# Patient Record
Sex: FEMALE | Race: WHITE | NOT HISPANIC OR LATINO | Employment: FULL TIME | URBAN - METROPOLITAN AREA
[De-identification: names, ages, dates, MRNs, and addresses within clinical notes are randomized per-mention and may not be internally consistent; named-entity substitution may affect disease eponyms.]

---

## 2023-08-19 ENCOUNTER — OFFICE VISIT (OUTPATIENT)
Dept: URGENT CARE | Facility: CLINIC | Age: 53
End: 2023-08-19
Payer: COMMERCIAL

## 2023-08-19 VITALS
HEIGHT: 65 IN | DIASTOLIC BLOOD PRESSURE: 77 MMHG | TEMPERATURE: 98 F | RESPIRATION RATE: 18 BRPM | SYSTOLIC BLOOD PRESSURE: 123 MMHG | OXYGEN SATURATION: 99 % | HEART RATE: 78 BPM | BODY MASS INDEX: 20.83 KG/M2 | WEIGHT: 125 LBS

## 2023-08-19 DIAGNOSIS — J34.89 SINUS PRESSURE: ICD-10-CM

## 2023-08-19 DIAGNOSIS — U07.1 COVID: ICD-10-CM

## 2023-08-19 DIAGNOSIS — R51.9 ACUTE INTRACTABLE HEADACHE, UNSPECIFIED HEADACHE TYPE: Primary | ICD-10-CM

## 2023-08-19 DIAGNOSIS — R35.0 FREQUENCY OF URINATION: ICD-10-CM

## 2023-08-19 DIAGNOSIS — M54.9 BACK PAIN, UNSPECIFIED BACK LOCATION, UNSPECIFIED BACK PAIN LATERALITY, UNSPECIFIED CHRONICITY: ICD-10-CM

## 2023-08-19 LAB
BILIRUB UR QL STRIP: NEGATIVE
CTP QC/QA: YES
GLUCOSE UR QL STRIP: NEGATIVE
KETONES UR QL STRIP: NEGATIVE
LEUKOCYTE ESTERASE UR QL STRIP: NEGATIVE
PH, POC UA: 6
POC BLOOD, URINE: NEGATIVE
POC NITRATES, URINE: NEGATIVE
PROT UR QL STRIP: NEGATIVE
SARS-COV-2 AG RESP QL IA.RAPID: NEGATIVE
SP GR UR STRIP: 1.01 (ref 1–1.03)
UROBILINOGEN UR STRIP-ACNC: 0.2 (ref 0.1–1.1)

## 2023-08-19 PROCEDURE — 99214 PR OFFICE/OUTPT VISIT, EST, LEVL IV, 30-39 MIN: ICD-10-PCS | Mod: S$GLB,,, | Performed by: NURSE PRACTITIONER

## 2023-08-19 PROCEDURE — 87811 SARS-COV-2 COVID19 W/OPTIC: CPT | Mod: QW,S$GLB,, | Performed by: NURSE PRACTITIONER

## 2023-08-19 PROCEDURE — 99214 OFFICE O/P EST MOD 30 MIN: CPT | Mod: S$GLB,,, | Performed by: NURSE PRACTITIONER

## 2023-08-19 PROCEDURE — 81003 URINALYSIS AUTO W/O SCOPE: CPT | Mod: QW,S$GLB,, | Performed by: NURSE PRACTITIONER

## 2023-08-19 PROCEDURE — 87811 SARS CORONAVIRUS 2 ANTIGEN POCT, MANUAL READ: ICD-10-PCS | Mod: QW,S$GLB,, | Performed by: NURSE PRACTITIONER

## 2023-08-19 PROCEDURE — 81003 POCT URINALYSIS, DIPSTICK, AUTOMATED, W/O SCOPE: ICD-10-PCS | Mod: QW,S$GLB,, | Performed by: NURSE PRACTITIONER

## 2023-08-19 RX ORDER — TOPIRAMATE 100 MG/1
100 TABLET, FILM COATED ORAL 2 TIMES DAILY
COMMUNITY

## 2023-08-19 RX ORDER — TRAZODONE HYDROCHLORIDE 50 MG/1
50 TABLET ORAL NIGHTLY
COMMUNITY
Start: 2023-08-12

## 2023-08-19 RX ORDER — CYCLOBENZAPRINE HCL 5 MG
10 TABLET ORAL NIGHTLY PRN
Qty: 15 TABLET | Refills: 0 | Status: SHIPPED | OUTPATIENT
Start: 2023-08-19

## 2023-08-19 RX ORDER — ATORVASTATIN CALCIUM 40 MG/1
40 TABLET, FILM COATED ORAL DAILY
COMMUNITY

## 2023-08-19 RX ORDER — LINACLOTIDE 145 UG/1
145 CAPSULE, GELATIN COATED ORAL EVERY MORNING
COMMUNITY
Start: 2023-08-12

## 2023-08-19 NOTE — PATIENT INSTRUCTIONS
"Hi Savannah,     Claritin(loratadine), Allegra(fexofenadine), or zyrtec(cetirizine) for runny nose, post nasal drip, and congestion.   Mucinex DM for cough.    Tylenol 500 mg every 6 hours for fever or pain.   Ibuprofen 600 mg every 6 hours for fever or pain.   Flonase daily for 2 weeks  Sudafed 30 mg every 4-6 hours for "stuffy ears"  Go to er for shortness of breath, chest pain, or fever 101.   Wear a mask for 10 days.   "

## 2023-08-19 NOTE — PROGRESS NOTES
"Subjective:      Patient ID: Savannah Martines is a 52 y.o. female.    Vitals:  height is 5' 5" (1.651 m) and weight is 56.7 kg (125 lb). Her oral temperature is 98.4 °F (36.9 °C). Her blood pressure is 123/77 and her pulse is 78. Her respiration is 18 and oxygen saturation is 99%.     Chief Complaint: Headache, Sinus Problem, Abdominal Pain, Back Pain, Urinary Tract Infection, and Ear Fullness    Pt states that her symptoms started on 2 weeks ago. Patient states that her symptoms are the following: headaches, sneezing, sinus pressure, pain in abdomen and back, hard time focusing, frequency, urgency, bilateral ear fullness.pt states that she has hx of hypercalcemia, and kidney issues. Patient denies any other symptoms  New patient.  Does not indicate a timing for any worsening of the symptoms.      ROS   Objective:     Physical Exam   Constitutional: She is oriented to person, place, and time.   HENT:   Head: Normocephalic and atraumatic.   Ears:   Right Ear: Tympanic membrane normal.   Left Ear: Tympanic membrane normal.   Nose: Nose normal.   Mouth/Throat: Mucous membranes are moist. Oropharynx is clear.   Eyes: Pupils are equal, round, and reactive to light.   Neck: Neck supple.   Cardiovascular: Normal rate, regular rhythm, normal heart sounds and normal pulses.   Pulmonary/Chest: Effort normal and breath sounds normal.   Abdominal: Normal appearance. Soft. flat abdomen   Musculoskeletal: Normal range of motion.         General: Normal range of motion.   Neurological: no focal deficit. She is alert, oriented to person, place, and time and at baseline. She displays no weakness. No cranial nerve deficit or sensory deficit. Coordination and gait normal.   Skin: Skin is warm and dry. Capillary refill takes less than 2 seconds.       Assessment:     1. Acute intractable headache, unspecified headache type    2. Sinus pressure    3. Back pain, unspecified back location, unspecified back pain laterality, unspecified " "chronicity    4. Frequency of urination    5. COVID        Plan:       Acute intractable headache, unspecified headache type  -     SARS Coronavirus 2 Antigen, POCT Manual Read  -     Cancel: POCT Influenza A/B Rapid Antigen    Sinus pressure  -     SARS Coronavirus 2 Antigen, POCT Manual Read  -     Cancel: POCT Influenza A/B Rapid Antigen    Back pain, unspecified back location, unspecified back pain laterality, unspecified chronicity  -     POCT Urinalysis, Dipstick, Automated, W/O Scope    Frequency of urination  -     POCT Urinalysis, Dipstick, Automated, W/O Scope    COVID    Other orders  -     cyclobenzaprine (FLEXERIL) 5 MG tablet; Take 2 tablets (10 mg total) by mouth nightly as needed for Muscle spasms.  Dispense: 15 tablet; Refill: 0                0      COVID positive low risk for complications.  Point of service urinalysis negative for UTI.  Neck spasm no evidence of nuchal rigidity.  Kernig sign is negative.  Palpable spasms of the cervical spine noted Flexeril as above.  May take Tylenol and ibuprofen.    Patient Instructions   Hi Savannah,     Claritin(loratadine), Allegra(fexofenadine), or zyrtec(cetirizine) for runny nose, post nasal drip, and congestion.   Mucinex DM for cough.    Tylenol 500 mg every 6 hours for fever or pain.   Ibuprofen 600 mg every 6 hours for fever or pain.   Flonase daily for 2 weeks  Sudafed 30 mg every 4-6 hours for "stuffy ears"  Go to er for shortness of breath, chest pain, or fever 101.   Wear a mask for 10 days.    In excessive of 30 minutes total time spent for evaluation and management services. Time included elements of the following: time spent preparing to see patient, obtaining and reviewing separately obtained history, exam, evaluation, counseling and education of patient/family member or care giver, documenting in the HMR, independently interpreting results and communication of results, coordination of care ordering medications, tests, or procedures, referral " and communication to other health care professionals.

## 2025-02-19 ENCOUNTER — OFFICE VISIT (OUTPATIENT)
Dept: URGENT CARE | Facility: CLINIC | Age: 55
End: 2025-02-19
Payer: COMMERCIAL

## 2025-02-19 VITALS
TEMPERATURE: 99 F | RESPIRATION RATE: 16 BRPM | DIASTOLIC BLOOD PRESSURE: 78 MMHG | BODY MASS INDEX: 20.83 KG/M2 | HEART RATE: 94 BPM | OXYGEN SATURATION: 99 % | SYSTOLIC BLOOD PRESSURE: 124 MMHG | HEIGHT: 65 IN | WEIGHT: 125 LBS

## 2025-02-19 DIAGNOSIS — M79.672 LEFT FOOT PAIN: Primary | ICD-10-CM

## 2025-02-19 RX ORDER — KETOROLAC TROMETHAMINE 30 MG/ML
30 INJECTION, SOLUTION INTRAMUSCULAR; INTRAVENOUS
Status: COMPLETED | OUTPATIENT
Start: 2025-02-19 | End: 2025-02-19

## 2025-02-19 RX ADMIN — KETOROLAC TROMETHAMINE 30 MG: 30 INJECTION, SOLUTION INTRAMUSCULAR; INTRAVENOUS at 01:02

## 2025-02-19 NOTE — PROGRESS NOTES
"Subjective:      Patient ID: Savannah Martines is a 54 y.o. female.    Vitals:  height is 5' 5" (1.651 m) and weight is 56.7 kg (125 lb). Her oral temperature is 99.4 °F (37.4 °C). Her blood pressure is 124/78 and her pulse is 94. Her respiration is 16 and oxygen saturation is 99%.     Chief Complaint: Foot Injury (Left)    Last night, patient got up in the middle of the night to go the bathroom.  While walking to the bathroom she accidentally kicked the door frame.  Complaining of left foot 3rd, 4th, and 5th foot pain with pain radiating into for foot.  She has taken nothing for pain.  She did elevate last night while sleeping.  No numbness or tingling.  No history of neuropathy.  Pain is aggravated with weight-bearing activities.  No associated symptoms.  No alleviating factors.  Pain has been constant    Foot Injury   Incident onset: Yesterday. The injury mechanism was a direct blow. The pain is present in the left foot and left toes. The pain is at a severity of 6/10. The pain has been Constant since onset. She has tried elevation for the symptoms. The treatment provided no relief.       Musculoskeletal:  Positive for pain, trauma and joint pain. Negative for joint swelling and abnormal ROM of joint.   Skin: Negative.       Objective:     Physical Exam   Constitutional: She is oriented to person, place, and time. She is cooperative.   HENT:   Head: Normocephalic and atraumatic.   Ears:   Right Ear: External ear normal.   Left Ear: External ear normal.   Nose: Nose normal.   Mouth/Throat: Uvula is midline, oropharynx is clear and moist and mucous membranes are normal. Mucous membranes are moist. Oropharynx is clear.   Eyes: Conjunctivae and lids are normal. Pupils are equal, round, and reactive to light. Extraocular movement intact   Neck: Trachea normal and phonation normal. Neck supple.   Cardiovascular: Normal rate, regular rhythm, normal heart sounds and normal pulses.   Pulmonary/Chest: Effort normal and " breath sounds normal.   Abdominal: Normal appearance.   Musculoskeletal: Normal range of motion.         General: Normal range of motion.        Feet:    Neurological: no focal deficit. She is alert, oriented to person, place, and time and at baseline. She has normal motor skills, normal sensation and intact cranial nerves (2-12). Gait and coordination normal. GCS eye subscore is 4. GCS verbal subscore is 5. GCS motor subscore is 6.   Skin: Skin is warm and dry. Capillary refill takes 2 to 3 seconds.   Psychiatric: She experiences Normal attention and Normal perception. Her speech is normal and behavior is normal. Mood, judgment and thought content normal.   Nursing note and vitals reviewed.      Assessment:     1. Left foot pain        Plan:       Left foot pain  -     XR FOOT COMPLETE 3 VIEW LEFT; Future; Expected date: 02/19/2025  -     ketorolac injection 30 mg  -     HME - OTHER          Medical Decision Making:   History:   Old Medical Records: I decided to obtain old medical records.  Initial Assessment:   Left 3rd, 4th, and 5th toe injury.  No obvious deformity, ecchymosis, or laceration  Differential Diagnosis:   Fracture, dislocation, contusion  Clinical Tests:   Radiological Study: Reviewed and Ordered  Urgent Care Management:  Blunt trauma last night.  No popping clicking sensation.  Range of motions intact.  No significant signs of trauma.  She is ambulatory with a normal steady gait.  Placed an order for postop shoe for comfort.  Given Toradol for symptomatic treatment.  Instructed for price therapy.  If no improvement in 2 weeks to follow up with Podiatry.  Any numbness or tingling may return to clinic for repeat x-rays.

## 2025-02-19 NOTE — PATIENT INSTRUCTIONS
[employer] to  postop shoe.  Motrin/Tylenol as needed for pain   Protect from further injury, rest, ice, compress, elevate, avoid overuse.

## 2025-03-26 ENCOUNTER — OFFICE VISIT (OUTPATIENT)
Dept: URGENT CARE | Facility: CLINIC | Age: 55
End: 2025-03-26
Payer: COMMERCIAL

## 2025-03-26 ENCOUNTER — HOSPITAL ENCOUNTER (OUTPATIENT)
Dept: RADIOLOGY | Facility: HOSPITAL | Age: 55
Discharge: HOME | End: 2025-03-26
Attending: STUDENT IN AN ORGANIZED HEALTH CARE EDUCATION/TRAINING PROGRAM
Payer: COMMERCIAL

## 2025-03-26 VITALS
TEMPERATURE: 99 F | RESPIRATION RATE: 16 BRPM | OXYGEN SATURATION: 99 % | SYSTOLIC BLOOD PRESSURE: 109 MMHG | DIASTOLIC BLOOD PRESSURE: 70 MMHG | HEART RATE: 87 BPM

## 2025-03-26 DIAGNOSIS — M79.671 RIGHT FOOT PAIN: ICD-10-CM

## 2025-03-26 DIAGNOSIS — M79.89 SWELLING OF RIGHT FOOT: ICD-10-CM

## 2025-03-26 DIAGNOSIS — S92.901A CLOSED FRACTURE OF RIGHT FOOT, INITIAL ENCOUNTER: ICD-10-CM

## 2025-03-26 DIAGNOSIS — S99.921A INJURY OF RIGHT FOOT, INITIAL ENCOUNTER: ICD-10-CM

## 2025-03-26 DIAGNOSIS — S99.921A INJURY OF RIGHT FOOT, INITIAL ENCOUNTER: Primary | ICD-10-CM

## 2025-03-26 PROCEDURE — 73630 X-RAY EXAM OF FOOT: CPT | Mod: TC,PO,RT

## 2025-03-26 PROCEDURE — 73630 X-RAY EXAM OF FOOT: CPT | Mod: 26,RT,, | Performed by: RADIOLOGY

## 2025-03-26 PROCEDURE — 99214 OFFICE O/P EST MOD 30 MIN: CPT | Mod: S$GLB,,, | Performed by: STUDENT IN AN ORGANIZED HEALTH CARE EDUCATION/TRAINING PROGRAM

## 2025-03-26 RX ORDER — NAPROXEN 500 MG/1
500 TABLET ORAL 2 TIMES DAILY WITH MEALS
Qty: 20 TABLET | Refills: 0 | Status: SHIPPED | OUTPATIENT
Start: 2025-03-26 | End: 2025-04-05

## 2025-03-26 RX ORDER — TRAMADOL HYDROCHLORIDE 50 MG/1
50 TABLET ORAL EVERY 8 HOURS PRN
Qty: 15 TABLET | Refills: 0 | Status: SHIPPED | OUTPATIENT
Start: 2025-03-26

## 2025-03-26 NOTE — PROGRESS NOTES
Subjective:      Patient ID: Savannah Martines is a 54 y.o. female.    Vitals:  temperature is 98.9 °F (37.2 °C). Her blood pressure is 109/70 and her pulse is 87. Her respiration is 16 and oxygen saturation is 99%.     Chief Complaint: Foot Injury    Patient is a 54-year-old female presents to clinic for evaluation of right foot injury.  Patient reports injury occurred yesterday mid day.  Patient reports was running after her puppy.  Patient states rolled her right foot.  Patient states experiencing pain and swelling to the outside portion of the foot.  Patient states has not noticed any open wounds, skin discoloration or paresthesias to include numbness or tingling.  Patient states pain is constant and aching or throbbing in nature.  Patient states pain at rest is about a 4 on a 10 scale but as worst an 8 on 10 scale.  Patient states pain is aggravated by ambulation and weight-bearing.  Patient states ambulating only on the rear heel of the foot.  Patient states that she has had prior fractures of the foot in the past.  Patient states recently injured her left foot as well.  Patient states has walking shoe at home.  Patient states has taken Tylenol with only mild relief to symptoms.  Patient states pain does not radiate to any other location.  Patient states no symptoms of the ankle or leg itself.        Constitution: Negative. Negative for chills, sweating, fatigue and fever.   HENT: Negative.     Neck: neck negative.   Cardiovascular: Negative.  Negative for chest pain and palpitations.   Eyes: Negative.    Respiratory: Negative.  Negative for chest tightness, cough and shortness of breath.    Gastrointestinal: Negative.  Negative for abdominal pain, nausea, vomiting and diarrhea.   Endocrine: negative.   Genitourinary: Negative.  Negative for dysuria, frequency and urgency.   Musculoskeletal:  Positive for trauma, joint pain (Right foot), joint swelling (Right foot) and abnormal ROM of joint (Right foot).   Skin:  Negative.  Negative for color change, pale, rash, wound and erythema.   Allergic/Immunologic: Negative.    Neurological:  Negative for dizziness, light-headedness, passing out, headaches, disorientation, altered mental status, numbness and tingling.   Hematologic/Lymphatic: Negative.    Psychiatric/Behavioral: Negative.  Negative for altered mental status, disorientation and confusion.       Objective:     Physical Exam   Constitutional: She is oriented to person, place, and time. She appears well-developed. She is cooperative.  Non-toxic appearance. She does not appear ill. No distress.   HENT:   Head: Normocephalic and atraumatic.   Ears:   Right Ear: Hearing and external ear normal.   Left Ear: Hearing and external ear normal.   Nose: Nose normal. No mucosal edema or nasal deformity. No epistaxis. Right sinus exhibits no maxillary sinus tenderness and no frontal sinus tenderness. Left sinus exhibits no maxillary sinus tenderness and no frontal sinus tenderness.   Mouth/Throat: Uvula is midline, oropharynx is clear and moist and mucous membranes are normal. Mucous membranes are moist. No trismus in the jaw. Normal dentition. No uvula swelling. Oropharynx is clear.   Eyes: Conjunctivae and lids are normal. Pupils are equal, round, and reactive to light. Right eye exhibits no discharge. Left eye exhibits no discharge. No scleral icterus.   Neck: Trachea normal and phonation normal. Neck supple. No neck rigidity present.   Cardiovascular: Normal rate, regular rhythm and normal pulses.   Pulmonary/Chest: Effort normal and breath sounds normal. No respiratory distress. She has no wheezes. She has no rhonchi. She has no rales.   Abdominal: Normal appearance and bowel sounds are normal. She exhibits no distension. Soft.   Musculoskeletal:         General: Signs of injury present.      Cervical back: She exhibits no tenderness.      Right foot: Decreased range of motion. Normal capillary refill. Tenderness and swelling  present. No crepitus.      Comments: Moderate tenderness and swelling noted to the lateral aspect of the right foot primarily along the 4th and 5th metatarsal region.  No open wound, crepitus or skin discoloration noted.  Cap refill less than 2 seconds.  Positive pulse motor and sensory.  Limping gait.   Neurological: She is alert and oriented to person, place, and time. She exhibits normal muscle tone.   Skin: Skin is warm, dry, intact, not diaphoretic, not pale and no rash. Capillary refill takes less than 2 seconds. No bruising and No erythema   Psychiatric: Her speech is normal and behavior is normal. Judgment and thought content normal.   Nursing note and vitals reviewed.      Assessment:     1. Injury of right foot, initial encounter    2. Right foot pain    3. Swelling of right foot    4. Closed fracture of right foot, initial encounter        Plan:       Injury of right foot, initial encounter  -     XR FOOT COMPLETE 3 VIEW RIGHT; Future; Expected date: 03/26/2025  -     Walking Boot For Home Use  -     CRUTCHES FOR HOME USE    Right foot pain  -     XR FOOT COMPLETE 3 VIEW RIGHT; Future; Expected date: 03/26/2025    Swelling of right foot  -     XR FOOT COMPLETE 3 VIEW RIGHT; Future; Expected date: 03/26/2025    Closed fracture of right foot, initial encounter  -     Ambulatory referral/consult to Podiatry  -     traMADoL (ULTRAM) 50 mg tablet; Take 1 tablet (50 mg total) by mouth every 8 (eight) hours as needed for Pain.  Dispense: 15 tablet; Refill: 0    Other orders  -     naproxen (NAPROSYN) 500 MG tablet; Take 1 tablet (500 mg total) by mouth 2 (two) times daily with meals. for 10 days  Dispense: 20 tablet; Refill: 0                X-ray not available in clinic at current.  Patient provided with stat outpatient x-ray order for x-ray of the right foot.    Patient provided with order for crutches.    Patient provided with order for orthopedic boot.    Will determine medication based off of imaging.    checked through epic.  No recent injuries however does appear [id number] (with the last being June 2024) to have prescriptions for medical marijuana products.  Protect area from further injury.  Rest.  Ice.  Boot.  Elevation.    Recommend limited weight-bearing of right lower extremity.  Use of crutches as educated.    Follow-up with PCP in 1-2 days.    Return to clinic as needed.  Will refer to orthopedics if as needed based off of imaging.    Patient should follow-up orthopedics if symptoms not improving otherwise and 2-3 weeks.    To ED for any new or acutely worsening symptoms.    Patient in agreement with plan of care.  Patient assisted out to vehicle via wheelchair.    Acute nondisplaced transverse fracture of the base of the 5th metatarsal with associated regional soft tissue swelling.   Patient notified of results.  Referral for Podiatry placed.  Take meds as prescribed.   checked through [employer].  Use of no other NSAIDs while on naproxen; may rotate with Tylenol.    DISCLAIMER: Please note that my documentation in this Electronic Healthcare Record was produced using speech recognition software and therefore may contain errors related to that software system.These could include grammar, punctuation and spelling errors or the inclusion/exclusion of phrases that were not intended. Garbled syntax, mangled pronouns, and other bizarre constructions may be attributed to that software system.

## 2025-03-31 ENCOUNTER — OFFICE VISIT (OUTPATIENT)
Dept: PODIATRY | Facility: CLINIC | Age: 55
End: 2025-03-31
Payer: COMMERCIAL

## 2025-03-31 VITALS — HEIGHT: 65 IN | WEIGHT: 124.75 LBS | RESPIRATION RATE: 18 BRPM | BODY MASS INDEX: 20.79 KG/M2

## 2025-03-31 DIAGNOSIS — S93.411A SPRAIN OF CALCANEOFIBULAR LIGAMENT OF RIGHT ANKLE, INITIAL ENCOUNTER: ICD-10-CM

## 2025-03-31 DIAGNOSIS — M25.571 ACUTE RIGHT ANKLE PAIN: ICD-10-CM

## 2025-03-31 DIAGNOSIS — S93.491A SPRAIN OF ANTERIOR TALOFIBULAR LIGAMENT OF RIGHT ANKLE, INITIAL ENCOUNTER: ICD-10-CM

## 2025-03-31 DIAGNOSIS — S92.354A NONDISPLACED FRACTURE OF FIFTH METATARSAL BONE, RIGHT FOOT, INITIAL ENCOUNTER FOR CLOSED FRACTURE: Primary | ICD-10-CM

## 2025-03-31 DIAGNOSIS — M79.671 RIGHT FOOT PAIN: ICD-10-CM

## 2025-03-31 PROCEDURE — 99204 OFFICE O/P NEW MOD 45 MIN: CPT | Mod: S$GLB,,,

## 2025-03-31 PROCEDURE — 99999 PR PBB SHADOW E&M-EST. PATIENT-LVL III: CPT | Mod: PBBFAC,,,

## 2025-03-31 PROCEDURE — 1159F MED LIST DOCD IN RCRD: CPT | Mod: CPTII,S$GLB,,

## 2025-03-31 PROCEDURE — 1160F RVW MEDS BY RX/DR IN RCRD: CPT | Mod: CPTII,S$GLB,,

## 2025-03-31 PROCEDURE — 3008F BODY MASS INDEX DOCD: CPT | Mod: CPTII,S$GLB,,

## 2025-03-31 RX ORDER — ASPIRIN 325 MG
50000 TABLET, DELAYED RELEASE (ENTERIC COATED) ORAL
Qty: 12 CAPSULE | Refills: 0 | Status: SHIPPED | OUTPATIENT
Start: 2025-03-31 | End: 2025-06-23

## 2025-03-31 NOTE — PROGRESS NOTES
"  [address] [facility] [address] [place] [address]  Phone: [phone]   [phone]    Patient's PCP:Ekta, Primary Doctor  Referring Provider: Toney Cochran    Subjective:      Chief Complaint:: Foot Injury (Right foot injury 3/25/25)    DL Martines is a 54 y.o. female who presents today with a complaint of right foot injury. The current episode started 3/25/25.  The symptoms include pain, swelling discoloration and bruising. Probable cause of complaint slipped and rolled foot.  The symptoms are aggravated by walking, weightbearing range of motion. The problem has stayed the same. Treatment to date have included x-ray, tramadol, naproxen and boot cast with use of crutches to ambulate which provided some relief.       Vitals:    03/31/25 1011   Resp: 18   Weight: 56.6 kg (124 lb 12.5 oz)   Height: 5' 5" (1.651 m)   PainSc:   5      Shoe Size: 9-10    History reviewed. No pertinent surgical history.  History reviewed. No pertinent past medical history.  No family history on file.     Social History:   Marital Status: Single  Alcohol History:  has no history on file for alcohol use.  Tobacco History:  reports that she has never smoked. She has never used smokeless tobacco.  Drug History:  has no history on file for drug use.    Review of patient's allergies indicates:  No Known Allergies    Current Medications[1]    Review of Systems   Constitutional:  Negative for activity change, chills and fever.   Cardiovascular:  Negative for leg swelling.   Gastrointestinal:  Negative for diarrhea, nausea and vomiting.   Musculoskeletal:  Negative for arthralgias, back pain, gait problem, joint swelling and myalgias.   Skin:  Negative for color change, pallor, rash and wound.   Neurological:  Negative for dizziness, tremors, weakness and numbness.         Objective:        Physical Exam:   Foot Exam    Right Foot/Ankle     Inspection and Palpation  Right foot ecchymosis: lateral aspect of foot.  Tenderness: " (ATFL, CFL, most tender base of 5th met, positive piano sign)  Swelling: (dorsum and latetal aspect of the foot and ankle)  Arch: normal  Hammertoes: absent  Claw Toes: absent  Hallux valgus: no  Hallux limitus: no  Skin Exam: skin intact; no blister, no callus, no drainage, no cellulitis, no abnormal color, no ulcer and no erythema   Fungus Toenails: not present  Neurovascular  Dorsalis pedis: 2+  Posterior tibial: 2+  Capillary Refill: 3+  Varicose veins: not present  Saphenous nerve sensation: normal  Tibial nerve sensation: normal  Superficial peroneal nerve sensation: normal  Deep peroneal nerve sensation: normal  Sural nerve sensation: normal    Range of Motion    Passive  Ankle eversion: pain  Ankle inversion: pain      Tests  Anterior drawer: negative   Varus tilt: negative   Syndesmosis squeeze: negative   Calcaneal squeeze: negative   Talar tilt: negative   PT Tinel's sign: negative          Imaging:   Right foot Xray 3 Views 03/26/25 independent interpretation:   Mild HAV, Mild asymetric 1st MTPJ join space narrowing, mild increse in lateral divation angle of the 5th metatarsal head. There is a non displaced avulsion fracture of the 5th metatrsal styloid process with transverse and vertial extension.          Assessment:       1. Nondisplaced fracture of fifth metatarsal bone, right foot, initial encounter for closed fracture    2. Sprain of anterior talofibular ligament of right ankle, initial encounter    3. Sprain of calcaneofibular ligament of right ankle, initial encounter    4. Right foot pain    5. Acute right ankle pain      Plan:   Nondisplaced fracture of fifth metatarsal bone, right foot, initial encounter for closed fracture    Sprain of anterior talofibular ligament of right ankle, initial encounter    Sprain of calcaneofibular ligament of right ankle, initial encounter    Right foot pain    Acute right ankle pain    Other orders  -     cholecalciferol, vitamin D3, 1,250 mcg (50,000 unit)  capsule; Take 1 capsule (50,000 Units total) by mouth every 7 days. Take once weekly to help with right foot fracture healing  Dispense: 12 capsule; Refill: 0      I provided patient education verbally regarding: Patient diagnosis, treatment options, as well as alternatives, risks, and benefits.     Right foot xray, reviewed, independently interpreted above, and discussed with patient.      Non surgical fractures right foot, closed, extra-articulator, minimally displaced, in highly vascularized bone.     No neurovascular concern     Patient agrees with conservative management      Pain medication as directed      Rest the Right foot as much as possible, WB as little as possible     Elevate above the level of the heart      Ice directly on the top of the foot or behind the right knee      Compression dressing applied, compressive Tubigrip stocking provided     Keep foot protected in CAM walker fracture boot, WBAT, use pain to guide tolerance and use.      Return in 4 - 6 weeks for follow up imaging, return sooner if no improvement or symptoms worsen     Will reassess lateral collateral ligaments at that time to determine if more advance imaging is indicated.      This note was created using Dragon voice recognition software that occasionally misinterpreted phrases or words.      Guzman Aervalo DPM  Podiatry / Foot and Ankle Surgery   Secure chat preferred          [1]   Current Outpatient Medications   Medication Sig Dispense Refill    cholecalciferol, vitamin D3, 1,250 mcg (50,000 unit) capsule Take 1 capsule (50,000 Units total) by mouth every 7 days. Take once weekly to help with right foot fracture healing 12 capsule 0    naproxen (NAPROSYN) 500 MG tablet Take 1 tablet (500 mg total) by mouth 2 (two) times daily with meals. for 10 days 20 tablet 0    traMADoL (ULTRAM) 50 mg tablet Take 1 tablet (50 mg total) by mouth every 8 (eight) hours as needed for Pain. 15 tablet 0     No current facility-administered  medications for this visit.

## 2025-05-09 ENCOUNTER — OFFICE VISIT (OUTPATIENT)
Dept: PODIATRY | Facility: CLINIC | Age: 55
End: 2025-05-09
Payer: COMMERCIAL

## 2025-05-09 ENCOUNTER — HOSPITAL ENCOUNTER (OUTPATIENT)
Dept: RADIOLOGY | Facility: CLINIC | Age: 55
Discharge: HOME | End: 2025-05-09
Payer: COMMERCIAL

## 2025-05-09 VITALS — HEIGHT: 65 IN | BODY MASS INDEX: 20.79 KG/M2 | RESPIRATION RATE: 16 BRPM | WEIGHT: 124.75 LBS

## 2025-05-09 DIAGNOSIS — M25.571 ACUTE RIGHT ANKLE PAIN: ICD-10-CM

## 2025-05-09 DIAGNOSIS — S92.354D NONDISPLACED FRACTURE OF FIFTH METATARSAL BONE, RIGHT FOOT, SUBSEQUENT ENCOUNTER FOR FRACTURE WITH ROUTINE HEALING: Primary | ICD-10-CM

## 2025-05-09 DIAGNOSIS — S93.491D SPRAIN OF ANTERIOR TALOFIBULAR LIGAMENT OF RIGHT ANKLE, SUBSEQUENT ENCOUNTER: ICD-10-CM

## 2025-05-09 DIAGNOSIS — S93.411D SPRAIN OF CALCANEOFIBULAR LIGAMENT OF RIGHT ANKLE, SUBSEQUENT ENCOUNTER: ICD-10-CM

## 2025-05-09 DIAGNOSIS — M79.671 RIGHT FOOT PAIN: ICD-10-CM

## 2025-05-09 PROCEDURE — 3008F BODY MASS INDEX DOCD: CPT | Mod: CPTII,S$GLB,,

## 2025-05-09 PROCEDURE — 99999 PR PBB SHADOW E&M-EST. PATIENT-LVL III: CPT | Mod: PBBFAC,,,

## 2025-05-09 PROCEDURE — 73630 X-RAY EXAM OF FOOT: CPT | Mod: RT,S$GLB,, | Performed by: RADIOLOGY

## 2025-05-09 PROCEDURE — 99213 OFFICE O/P EST LOW 20 MIN: CPT | Mod: S$GLB,,,

## 2025-05-29 ENCOUNTER — OFFICE VISIT (OUTPATIENT)
Dept: PODIATRY | Facility: CLINIC | Age: 55
End: 2025-05-29
Payer: COMMERCIAL

## 2025-05-29 ENCOUNTER — HOSPITAL ENCOUNTER (OUTPATIENT)
Dept: RADIOLOGY | Facility: CLINIC | Age: 55
Discharge: HOME | End: 2025-05-29
Payer: COMMERCIAL

## 2025-05-29 VITALS — BODY MASS INDEX: 22.26 KG/M2 | HEIGHT: 65 IN | HEART RATE: 69 BPM | WEIGHT: 133.63 LBS

## 2025-05-29 DIAGNOSIS — R26.81 GAIT INSTABILITY: ICD-10-CM

## 2025-05-29 DIAGNOSIS — M79.671 RIGHT FOOT PAIN: ICD-10-CM

## 2025-05-29 DIAGNOSIS — S92.354D NONDISPLACED FRACTURE OF FIFTH METATARSAL BONE, RIGHT FOOT, SUBSEQUENT ENCOUNTER FOR FRACTURE WITH ROUTINE HEALING: Primary | ICD-10-CM

## 2025-05-29 DIAGNOSIS — R53.81 PHYSICAL DECONDITIONING: ICD-10-CM

## 2025-05-29 DIAGNOSIS — S92.901A CLOSED FRACTURE OF RIGHT FOOT, INITIAL ENCOUNTER: ICD-10-CM

## 2025-05-29 PROCEDURE — 99999 PR PBB SHADOW E&M-EST. PATIENT-LVL III: CPT | Mod: PBBFAC,,,

## 2025-05-29 PROCEDURE — 1160F RVW MEDS BY RX/DR IN RCRD: CPT | Mod: CPTII,S$GLB,,

## 2025-05-29 PROCEDURE — 3008F BODY MASS INDEX DOCD: CPT | Mod: CPTII,S$GLB,,

## 2025-05-29 PROCEDURE — 1159F MED LIST DOCD IN RCRD: CPT | Mod: CPTII,S$GLB,,

## 2025-05-29 PROCEDURE — 99213 OFFICE O/P EST LOW 20 MIN: CPT | Mod: S$GLB,,,

## 2025-05-29 PROCEDURE — 73630 X-RAY EXAM OF FOOT: CPT | Mod: RT,S$GLB,, | Performed by: RADIOLOGY

## 2025-05-29 NOTE — PROGRESS NOTES
"  [address] [facility] [address]  [place] [address]  Phone: [phone]   [phone]    Patient's PCP:Ekta, Primary Doctor  Referring Provider: Toney Cochran    Subjective:      Chief Complaint:: Fracture of fifth metatarsal bone, RT    HPI  Savannah Martines is a 54 y.o. female who presents today for follow up of Nondisplaced fracture of fifth metatarsal bone, RT. PT tried to wean out of boot last week with 4/10 pain, weaned out of cam walking boot yesterday but still is not putting pressure on the foot. PT presents today in normal shoe with crutch. She is worried about putting pressure on the foot. States feelings of instability and deconditioning.          Vitals:    05/29/25 1459   Pulse: 69   Weight: 60.6 kg (133 lb 9.6 oz)   Height: 5' 5" (1.651 m)   PainSc: 0-No pain      Shoe Size: 9-10    History reviewed. No pertinent surgical history.  History reviewed. No pertinent past medical history.  No family history on file.     Social History:   Marital Status: Single  Alcohol History:  has no history on file for alcohol use.  Tobacco History:  reports that she has never smoked. She has never used smokeless tobacco.  Drug History:  has no history on file for drug use.    Review of patient's allergies indicates:  No Known Allergies    Current Medications[1]    Review of Systems   Constitutional:  Negative for activity change, chills and fever.   Cardiovascular:  Negative for leg swelling.   Gastrointestinal:  Negative for diarrhea, nausea and vomiting.   Musculoskeletal:  Negative for arthralgias, back pain, gait problem, joint swelling and myalgias.   Skin:  Negative for color change, pallor, rash and wound.   Neurological:  Negative for dizziness, tremors, weakness and numbness.         Objective:        Physical Exam:   Foot Exam    Right Foot/Ankle     Inspection and Palpation  Ecchymosis: none  Tenderness: none   Swelling: (dorsum and latetal aspect of the foot and ankle)  Arch: normal  Hammertoes: " absent  Claw Toes: absent  Hallux valgus: no  Hallux limitus: no  Skin Exam: no blister, no callus, no drainage, no cellulitis, no abnormal color, no ulcer and no erythema   Fungus Toenails: not present  Neurovascular  Dorsalis pedis: 2+  Posterior tibial: 2+  Capillary Refill: 3+  Varicose veins: not present  Saphenous nerve sensation: normal  Tibial nerve sensation: normal  Superficial peroneal nerve sensation: normal  Deep peroneal nerve sensation: normal  Sural nerve sensation: normal    Range of Motion    Passive  Ankle eversion: pain  Ankle inversion: pain      Tests  Anterior drawer: negative   Varus tilt: negative   Syndesmosis squeeze: negative   Calcaneal squeeze: negative   Talar tilt: negative   PT Tinel's sign: negative          Imaging:    XR Results:  Results for orders placed in visit on 05/29/25    X-Ray Foot Complete Right    Narrative  EXAMINATION:  XR FOOT COMPLETE 3 VIEW RIGHT    CLINICAL HISTORY:  . Nondisplaced fracture of fifth metatarsal bone, right foot, initial encounter for closed fracture    TECHNIQUE:  AP, lateral, and oblique views of the right foot were performed.    COMPARISON:  Foot x-rays of March 26, 2025 and May 9, 2025.  .    FINDINGS:  There is healing fracture of the proximal end of the 5th metatarsal without displacement. There appears to be disuse osteoporosis in the foot bones. Degenerative changes are noted at the 1st metatarsophalangeal joint    Impression  Healing fracture of the right 5th metatarsal.  Degenerative changes at the 1st metatarsophalangeal joint.  Disuse osteoporosis.      Electronically signed by: Jake Stanley MD  Date:    05/29/2025  Time:    15:21             Assessment:       1. Nondisplaced fracture of fifth metatarsal bone, right foot, subsequent encounter for fracture with routine healing    2. Right foot pain    3. Closed fracture of right foot, initial encounter    4. Physical deconditioning    5. Gait instability      Plan:   Nondisplaced  fracture of fifth metatarsal bone, right foot, subsequent encounter for fracture with routine healing  -     X-Ray Foot Complete Right  -     Ambulatory Referral/Consult to Physical Therapy; Future; Expected date: 06/16/2025    Right foot pain  -     X-Ray Foot Complete Right    Closed fracture of right foot, initial encounter  -     traMADoL (ULTRAM) 50 mg tablet; Take 1 tablet (50 mg total) by mouth every 8 (eight) hours as needed for Pain.  Dispense: 15 tablet; Refill: 0    Physical deconditioning  -     Ambulatory Referral/Consult to Physical Therapy; Future; Expected date: 06/16/2025    Gait instability  -     Ambulatory Referral/Consult to Physical Therapy; Future; Expected date: 06/16/2025      I provided patient education verbally regarding: Patient diagnosis, treatment options, as well as alternatives, risks, and benefits.       I provided patient education verbally regarding: Patient diagnosis, treatment options, as well as alternatives, risks, and benefits.   Personally reviewed X-ray right Foot, Three Views, and radiologists report. Discussed findings with patient.   There is a minor displaced avulsion fracture of the 5th metatrsal styloid process with transverse and vertial extension with continued signs of healing  Begin walking in boot without crutch for 1-2 weeks, than wean out as tolerated  Physical therapy in about 2 weeks to address RLE deconditioning and instability   Cont Vit D  Protect, Rest, Ice, light Compression, Elevation prn     Follow up 4 weeks, sooner prn   This note was created using Dragon voice recognition software that occasionally misinterpreted phrases or words.       Guzman Arevalo DPM  Podiatry / Foot and Ankle Surgery   Secure chat preferred          [1]   Current Outpatient Medications   Medication Sig Dispense Refill    cholecalciferol, vitamin D3, 1,250 mcg (50,000 unit) capsule Take 1 capsule (50,000 Units total) by mouth every 7 days. Take once weekly to help with  right foot fracture healing 12 capsule 0    traMADoL (ULTRAM) 50 mg tablet Take 1 tablet (50 mg total) by mouth every 8 (eight) hours as needed for Pain. 15 tablet 0     No current facility-administered medications for this visit.

## 2025-06-01 NOTE — PROGRESS NOTES
"  [address] [facility] [address]  [place] [address]  Phone: [phone]   [phone]    Patient's PCP:No, Primary Doctor  Referring Provider: Toney Cochran    Subjective:      Chief Complaint:: Follow-up (Right fifth metatarsal fracture)    Follow-up  Pertinent negatives include no arthralgias, chills, fever, joint swelling, myalgias, nausea, numbness, rash, vomiting or weakness.     Savannah Martines is a 54 y.o. female who presents today with a follow up complaint of right foot injury 3/25/25. The symptoms now improved had included pain, swelling discoloration and bruising after she slipped and rolled foot.  The symptoms were aggravated by walking, weightbearing range of motion. Presents in CAM walker with crutch assist. Currently endorses no pain.     Vitals:    05/09/25 1125   Resp: 16   Weight: 56.6 kg (124 lb 12.5 oz)   Height: 5' 5" (1.651 m)   PainSc: 0-No pain      Shoe Size: 9-10    History reviewed. No pertinent surgical history.  History reviewed. No pertinent past medical history.  No family history on file.     Social History:   Marital Status: Single  Alcohol History:  has no history on file for alcohol use.  Tobacco History:  reports that she has never smoked. She has never used smokeless tobacco.  Drug History:  has no history on file for drug use.    Review of patient's allergies indicates:  No Known Allergies    Current Medications[1]    Review of Systems   Constitutional:  Negative for activity change, chills and fever.   Cardiovascular:  Negative for leg swelling.   Gastrointestinal:  Negative for diarrhea, nausea and vomiting.   Musculoskeletal:  Negative for arthralgias, back pain, gait problem, joint swelling and myalgias.   Skin:  Negative for color change, pallor, rash and wound.   Neurological:  Negative for dizziness, tremors, weakness and numbness.         Objective:        Physical Exam:   Foot Exam    Right Foot/Ankle     Inspection and Palpation  Ecchymosis: " none  Tenderness: (base of 5th met)  Swelling: none   Arch: normal  Hammertoes: absent  Claw Toes: absent  Hallux valgus: no  Hallux limitus: no  Skin Exam: no blister, no callus, no drainage, no cellulitis, no abnormal color, no ulcer and no erythema   Fungus Toenails: not present  Neurovascular  Dorsalis pedis: 2+  Posterior tibial: 2+  Capillary Refill: 3+  Varicose veins: not present  Saphenous nerve sensation: normal  Tibial nerve sensation: normal  Superficial peroneal nerve sensation: normal  Deep peroneal nerve sensation: normal  Sural nerve sensation: normal    Muscle Strength  Ankle dorsiflexion: 5  Ankle plantar flexion: 5  Ankle inversion: 5  Ankle eversion: 5  Great toe extension: 5  Great toe flexion: 5    Range of Motion    Passive  Ankle eversion: pain  Ankle inversion: pain    Active  Right ankle active eversion: no pain.  Right ankle active inversion: no pain.    Tests  Anterior drawer: negative   Varus tilt: negative   Syndesmosis squeeze: negative   Calcaneal squeeze: negative   Talar tilt: negative   PT Tinel's sign: negative        Imaging:    XR Results:  Results for orders placed in visit on 05/09/25    X-Ray Foot Complete Right    Narrative  EXAMINATION:  XR FOOT COMPLETE 3 VIEW RIGHT    CLINICAL HISTORY:  . Nondisplaced fracture of fifth metatarsal bone, right foot, initial encounter for closed fracture    TECHNIQUE:  AP, lateral, and oblique views of the right foot were performed.    COMPARISON:  Right foot x-ray of March 26, 2025    FINDINGS:  There is healing fracture of the proximal end of the right 5th metatarsal without displacement.  Hypertrophic changes are noted of the distal head of the 1st metatarsal.  Other fractures are not seen.    Impression  Nondisplaced healing fracture of the proximal end of the right 5th metatarsal.  Hypertrophic changes of the distal end of the 1st metatarsal.      Electronically signed by: Jake Stanley MD  Date:    05/09/2025  Time:    11:45           Assessment:       1. Nondisplaced fracture of fifth metatarsal bone, right foot, initial encounter for closed fracture    2. Sprain of calcaneofibular ligament of right ankle, initial encounter    3. Sprain of anterior talofibular ligament of right ankle, initial encounter    4. Right foot pain      Plan:   Nondisplaced fracture of fifth metatarsal bone, right foot, initial encounter for closed fracture  -     X-Ray Foot Complete Right    Sprain of calcaneofibular ligament of right ankle, initial encounter    Sprain of anterior talofibular ligament of right ankle, initial encounter    Right foot pain      I provided patient education verbally regarding: Patient diagnosis, treatment options, as well as alternatives, risks, and benefits.   Personally reviewed X-ray right Foot, Three Views, and radiologists report. Discussed findings with patient.   There is a minor displaced avulsion fracture of the 5th metatrsal styloid process with transverse and vertial extension with signs of healing  Ace wrap compression   Continue CAM boot at least 1-2 more weeks before trying to wean out as tolerated.   Right foot xray, reviewed, independently interpreted above, and discussed with patient.   Cont Vit D  Protect, Rest, Ice, light Compression, Elevation prn     Follow up 4 weeks, sooner prn   This note was created using Dragon voice recognition software that occasionally misinterpreted phrases or words.      Guzman Arevalo DPM  Podiatry / Foot and Ankle Surgery   Secure chat preferred          [1]   Current Outpatient Medications   Medication Sig Dispense Refill    cholecalciferol, vitamin D3, 1,250 mcg (50,000 unit) capsule Take 1 capsule (50,000 Units total) by mouth every 7 days. Take once weekly to help with right foot fracture healing 12 capsule 0    traMADoL (ULTRAM) 50 mg tablet Take 1 tablet (50 mg total) by mouth every 8 (eight) hours as needed for Pain. 15 tablet 0     No current facility-administered medications  for this visit.

## 2025-06-02 ENCOUNTER — PATIENT MESSAGE (OUTPATIENT)
Dept: PODIATRY | Facility: CLINIC | Age: 55
End: 2025-06-02
Payer: COMMERCIAL

## 2025-06-09 RX ORDER — TRAMADOL HYDROCHLORIDE 50 MG/1
50 TABLET, FILM COATED ORAL EVERY 8 HOURS PRN
Qty: 15 TABLET | Refills: 0 | Status: SHIPPED | OUTPATIENT
Start: 2025-06-09

## 2025-06-16 ENCOUNTER — CLINICAL SUPPORT (OUTPATIENT)
Dept: REHABILITATION | Facility: HOSPITAL | Age: 55
End: 2025-06-16
Payer: COMMERCIAL

## 2025-06-16 DIAGNOSIS — S92.354D NONDISPLACED FRACTURE OF FIFTH METATARSAL BONE, RIGHT FOOT, SUBSEQUENT ENCOUNTER FOR FRACTURE WITH ROUTINE HEALING: ICD-10-CM

## 2025-06-16 DIAGNOSIS — M25.60 RANGE OF MOTION DEFICIT: Primary | ICD-10-CM

## 2025-06-16 DIAGNOSIS — R26.81 GAIT INSTABILITY: ICD-10-CM

## 2025-06-16 DIAGNOSIS — R53.81 PHYSICAL DECONDITIONING: ICD-10-CM

## 2025-06-16 DIAGNOSIS — R26.9 GAIT ABNORMALITY: ICD-10-CM

## 2025-06-16 PROCEDURE — 97530 THERAPEUTIC ACTIVITIES: CPT | Mod: PN

## 2025-06-16 PROCEDURE — 97161 PT EVAL LOW COMPLEX 20 MIN: CPT | Mod: PN

## 2025-06-16 NOTE — PROGRESS NOTES
Outpatient Rehab    Physical Therapy Evaluation    Patient Name: Savannah Martines  MRN: [id number]  YOB: 1970  Encounter Date: 6/16/2025    Therapy Diagnosis:   Encounter Diagnoses   Name Primary?    Nondisplaced fracture of fifth metatarsal bone, right foot, subsequent encounter for fracture with routine healing     Physical deconditioning     Gait instability     Range of motion deficit Yes    Gait abnormality      Physician: Guzman Arevalo DPM    Physician Orders: Eval and Treat  Medical Diagnosis: Nondisplaced fracture of fifth metatarsal bone, right foot, subsequent encounter for fracture with routine healing  Physical deconditioning  Gait instability  Surgical Diagnosis: Not applicable for this Episode   Surgical Date: Not applicable for this Episode  Days Since Last Surgery: Not applicable for this Episode    Visit # / Visits Authorized:  1 / 1  Insurance Authorization Period: 6/9/2025 to 6/9/2026  Date of Evaluation: 6/16/2025  Plan of Care Certification: 6/16/2025 to 8/11/2025     Time In: 0900   Time Out: 0940  Total Time (in minutes): 40   Total Billable Time (in minutes): 40    Intake Outcome Measure for FOTO Survey    Therapist reviewed FOTO scores for Savannah Martines on 6/16/2025.   FOTO report - see Media section or FOTO account episode details.     Intake Score: 61%      Precautions:       Subjective   History of Present Illness  Savannah is a 54 y.o. female who reports to physical therapy with a chief concern of Right foot pain.                 History of Present Condition/Illness: She broke her right foot about 3 months ago. She was running (she is not a runner) and her foot inverted. She broke the outer part of her foot. She did note she had broken the same foot in a different place about 15 years ago. After she broke her foot this time, she was in a cast for 9 weeks. Then she was in a walking boot for 9 weeks. She was also using crutches. Since she has been out of the walking boot  (May29th) she has been in a lot of pain. She does not tolerate weight bearing well. She feels like she is just walking on her heel. Her medial and lateral foot hurt like she is walking on her bones. Her ankle also hurts with dorsiflexion. It is hard for her to walk, but she feels more stable walking with a cane. If she steps a certain way it increases her pain. She can't move her toes as well on the right foot as the left foot. She can't walk as quickly as she used to due to pain. She gets winded more quickly and her endurance is poor. She used to get 7-8k steps in a day and is now only getting about 3k steps. She feels limited in her errands and Activities of daily living.     Pain     Patient reports a current pain level of 0/10. Pain at best is reported as 0/10. Pain at worst is reported as 7/10.   Location: Medial and lateral foot, anterior ankle  Clinical Progression (since onset): Improved  Pain Qualities: Aching, Dull, Sharp, Other (Comment)  Other Pain Qualities: shooting pain  Pain-Relieving Factors: Rest, Medications - over-the-counter, Medications - prescription  Pain-Aggravating Factors: Movement, Walking, Other (Comment)  Other Pain-Aggravating Factors: weight bearing           Past Medical History/Physical Systems Review:   Savannah Martines  has no past medical history on file.    Savannah Martines  has no past surgical history on file.    Savannah has a current medication list which includes the following prescription(s): cholecalciferol (vitamin d3) and tramadol.    Review of patient's allergies indicates:  No Known Allergies     Objective      Ankle/Foot Palpation     Pain with palpation at anterior Talocrural joint, lateral foot, and midfoot generally                 Ankle/Foot Range of Motion   Right Ankle/Foot   Active (deg) Passive (deg) Pain   Dorsiflexion (KE) 0   Yes   Dorsiflexion (KF)         Plantar Flexion 50       Ankle Inversion 35       Ankle Eversion 10   Yes   Subtalar Inversion        "  Subtalar Eversion         Great Toe MTP Flexion         Great Toe MTP Extension         Great Toe IP Flexion             Left Ankle/Foot   Active (deg) Passive (deg) Pain   Dorsiflexion (KE) 15       Dorsiflexion (KF)         Plantar Flexion 60       Ankle Inversion 35       Ankle Eversion 15       Subtalar Inversion         Subtalar Eversion         Great Toe MTP Flexion         Great Toe MTP Extension         Great Toe IP Flexion                            Ankle/Foot Strength - Planes of Motion   Right Strength Right Pain Left Strength Left  Pain   Dorsiflexion (L4) 4-   5     Plantar Flexion (S1) 3 Yes 4-     Inversion 4- Yes 4     Eversion 4-   4     Great Toe Flexion 3 Yes 4     Great Toe Extension (L5)           Lesser Toes Flexion 3 Yes 4     Lesser Toes Extension                  Ankle/Foot Joint Mobility  Right Ankle/Foot Joint Mobility  Hypomobile: Talocrural Joint, Subtalar Joint, Midfoot, and Forefoot  Left Ankle/Foot Joint Mobility  Normal: Talocrural Joint, Subtalar Joint, Midfoot, and Forefoot              Four Stage Balance Test                 Single Leg Stand - Right Foot: 2 sec  Single Leg Stand - Left Foot: 10 sec  Single leg balance on the right is painful     Gait Analysis  Base of Support: Wide  Gait Pattern: Antalgic  Walking Speed: Decreased         Left Side Walking Observations  Decreased: Stance Time     Ankle/Foot Observations During Gait  Right: Toe Out During Gait  Gait Analysis Details  Avoiding DF during stance phase with toe out position, avoiding putting weight on her forefoot by walking on her heel on the right.         Treatment:  Therapeutic Activity  TA 1: Education on condition and HEP (towel scrunches, seated heel slides, Single lag balance 15" with UE support)    Time Entry(in minutes):  PT Evaluation (Low) Time Entry: 30  Therapeutic Activity Time Entry: 10    Assessment & Plan   Assessment  Savannah presents with a condition of Low complexity.   Presentation of Symptoms: " Stable       Functional Limitations: Activity tolerance, Completing self-care activities, Range of motion, Proprioception, Participating in leisure activities, Pain with ADLs/IADLs, Painful locomotion/ambulation, Gait limitations, Decreased ambulation distance/endurance, Standing tolerance, Maintaining balance, Functional mobility  Impairments: Pain with functional activity, Lack of appropriate home exercise program, Impaired physical strength, Weight-bearing intolerance, Impaired balance, Abnormal gait  Personal Factors Affecting Prognosis: Pain, Physical limitations    Patient Goal for Therapy (PT): Be able to walk around and perform ADLs without pain  Prognosis: Good  Assessment Details: Savannah is a 54 year old female with medical diagnosis of non-displaced fracture of 5th metatarsal bone. She presents to clinic with gait impairments of right toe out avoiding DF of the right foot. She has painful and limited range of motion of on the right foot compared to the left. Weakness noted in the right foot and ankle. She is unable to single leg balance on the right foot due to pain without Upper extremity support. She has functional limitations of pain with weight bearing and walking.     Plan  From a physical therapy perspective, the patient would benefit from: Skilled Rehab Services    Planned therapy interventions include: Therapeutic exercise, Therapeutic activities, Neuromuscular re-education, and Manual therapy.            Visit Frequency: 2 times Per Week for 8 Weeks.       This plan was discussed with Patient.   Discussion participants: Agreed Upon Plan of Care             The patient's spiritual, cultural, and educational needs were considered, and the patient is agreeable to the plan of care and goals.           Goals:   Active       Long term goals       patient goal: Be able to walk around and perform ADLs without pain       Start:  06/16/25    Expected End:  08/11/25            patient will have improved  FOTO score to predicted level to show true functional improvements       Start:  06/16/25    Expected End:  08/11/25            patient will be independent in progressed Home Exercise Program to improve functional strength       Start:  06/16/25    Expected End:  08/11/25               Short term goals       patient will be independent in Home Exercise Program to improve range of motion       Start:  06/16/25    Expected End:  07/14/25            patient will have improved range of motion to equal the uninvolved side       Start:  06/16/25    Expected End:  07/14/25            patient will have improved ankle strength by 1/3 MMT       Start:  06/16/25    Expected End:  07/14/25                Kelsy Borrego, PT, DPT  Board Certified Clinical Specialist in Orthopedic Physical Therapy

## 2025-06-19 ENCOUNTER — CLINICAL SUPPORT (OUTPATIENT)
Dept: REHABILITATION | Facility: HOSPITAL | Age: 55
End: 2025-06-19
Payer: COMMERCIAL

## 2025-06-19 DIAGNOSIS — R26.9 GAIT ABNORMALITY: ICD-10-CM

## 2025-06-19 DIAGNOSIS — M25.60 RANGE OF MOTION DEFICIT: Primary | ICD-10-CM

## 2025-06-19 PROCEDURE — 97112 NEUROMUSCULAR REEDUCATION: CPT | Mod: PN

## 2025-06-19 PROCEDURE — 97530 THERAPEUTIC ACTIVITIES: CPT | Mod: PN

## 2025-06-19 NOTE — PROGRESS NOTES
"  Outpatient Rehab    Physical Therapy Visit    Patient Name: Savannah Martines  MRN: [id number]  YOB: 1970  Encounter Date: 6/19/2025    Therapy Diagnosis:   Encounter Diagnoses   Name Primary?    Range of motion deficit Yes    Gait abnormality      Physician: Guzman Arevalo DPM    Physician Orders: Eval and Treat  Medical Diagnosis: Nondisplaced fracture of fifth metatarsal bone, right foot, subsequent encounter for fracture with routine healing  Physical deconditioning  Gait instability  Surgical Diagnosis: Not applicable for this Episode   Surgical Date: Not applicable for this Episode  Days Since Last Surgery: Not applicable for this Episode    Visit # / Visits Authorized:  1 / 15  Insurance Authorization Period: 6/16/2025 to 12/31/2025  Date of Evaluation: 6/16/2025  Plan of Care Certification: 6/16/2025 to 8/11/2025      PT/PTA:     Number of PTA visits since last PT visit:   Time In: 0900   Time Out: 0940  Total Time (in minutes): 40   Total Billable Time (in minutes): 940    FOTO:  Intake Score:  %  Survey Score 2:  %  Survey Score 3:  %    Precautions:       Subjective   Her foot is a lot more sore from doing the exercises and walking on it more..  Pain reported as 3/10.      Objective            Treatment:  Manual Therapy  MT 1: Ankle AP joint mobs grade III, subtalar joint mobs grade III  MT 2: Metatarsal ray mobilizations grade II to tolerance  Balance/Neuromuscular Re-Education  NMR 1: Ankle 4 way, red band, x 30  NMR 2: Seated heel slides x 30  NMR 3: Seated towel scrunches x 30  NMR 4: Seated toe yoga x 30 each  NMR 5: Seated arch lifts x 30  NMR 6: Seated heel raise 10# KB x 30  NMR 7: Single leg balance on towel with B UE support 4 x 15"  Therapeutic Activity  TA 1: Nustep 8' level 1 for activity tolerance    Time Entry(in minutes):  Neuromuscular Re-Education Time Entry: 32  Therapeutic Activity Time Entry: 8    Assessment & Plan   Assessment: Savannah presented to clinic with " increased soreness from walking and doing her Home Exercise Program. Today we reviewed her Home Exercise Program and she did well with that. Progressed interventions which she performed well but did note pain. Suggested she break from her Home Exercise Program until tomorrow. Will progress as tolerated.  Evaluation/Treatment Tolerance: Patient limited by pain    The patient will continue to benefit from skilled outpatient physical therapy in order to address the deficits listed in the problem list on the initial evaluation, provide patient and family education, and maximize the patients level of independence in the home and community environments.     The patient's spiritual, cultural, and educational needs were considered, and the patient is agreeable to the plan of care and goals.           Plan: Progress as tolerated per plan of care.    Goals:   Active       Long term goals       patient goal: Be able to walk around and perform ADLs without pain (Progressing)       Start:  06/16/25    Expected End:  08/11/25            patient will have improved FOTO score to predicted level to show true functional improvements (Progressing)       Start:  06/16/25    Expected End:  08/11/25            patient will be independent in progressed Home Exercise Program to improve functional strength (Progressing)       Start:  06/16/25    Expected End:  08/11/25               Short term goals       patient will be independent in Home Exercise Program to improve range of motion (Progressing)       Start:  06/16/25    Expected End:  07/14/25            patient will have improved range of motion to equal the uninvolved side (Progressing)       Start:  06/16/25    Expected End:  07/14/25            patient will have improved ankle strength by 1/3 MMT (Progressing)       Start:  06/16/25    Expected End:  07/14/25                Kelsy Borrego, PT, DPT  Board Certified Clinical Specialist in Orthopedic Physical Therapy

## 2025-06-24 ENCOUNTER — CLINICAL SUPPORT (OUTPATIENT)
Dept: REHABILITATION | Facility: HOSPITAL | Age: 55
End: 2025-06-24
Payer: COMMERCIAL

## 2025-06-24 DIAGNOSIS — R26.9 GAIT ABNORMALITY: ICD-10-CM

## 2025-06-24 DIAGNOSIS — M25.60 RANGE OF MOTION DEFICIT: Primary | ICD-10-CM

## 2025-06-24 PROCEDURE — 97530 THERAPEUTIC ACTIVITIES: CPT | Mod: PN

## 2025-06-24 PROCEDURE — 97112 NEUROMUSCULAR REEDUCATION: CPT | Mod: PN

## 2025-06-25 NOTE — PROGRESS NOTES
"  Outpatient Rehab    Physical Therapy Visit    Patient Name: Savannah Martines  MRN: [id number]  YOB: 1970  Encounter Date: 6/24/2025    Therapy Diagnosis:   Encounter Diagnoses   Name Primary?    Range of motion deficit Yes    Gait abnormality      Physician: Guzman Arevalo DPM    Physician Orders: Eval and Treat  Medical Diagnosis: Nondisplaced fracture of fifth metatarsal bone, right foot, subsequent encounter for fracture with routine healing  Physical deconditioning  Gait instability  Surgical Diagnosis: Not applicable for this Episode   Surgical Date: Not applicable for this Episode  Days Since Last Surgery: Not applicable for this Episode    Visit # / Visits Authorized:  2 / 15  Insurance Authorization Period: 6/16/2025 to 12/31/2025  Date of Evaluation: 6/16/2025  Plan of Care Certification: 6/16/2025 to 8/11/2025      PT/PTA:     Number of PTA visits since last PT visit:   Time In: 1500   Time Out: 1545  Total Time (in minutes): 45   Total Billable Time (in minutes): 45    FOTO:  Intake Score:  %  Survey Score 2:  %  Survey Score 3:  %    Precautions:       Subjective   Still feeling sore but not much worse..  Pain reported as 3/10.      Objective            Treatment:  Manual Therapy  MT 1: Ankle AP joint mobs grade III, subtalar joint mobs grade III  MT 2: Metatarsal ray mobilizations grade II to tolerance  Balance/Neuromuscular Re-Education  NMR 1: Ankle 4 way, red band, x 30  NMR 2: Seated heel slides x 30  NMR 3: Seated towel scrunches x 30  NMR 4: Seated toe yoga x 30 each  NMR 5: Seated arch lifts x 30  NMR 6: Seated heel raise 10# KB x 30  Therapeutic Activity  TA 1: Nustep 10' level 1 for activity tolerance  TA 2: Single leg balance on towel with B UE support 4 x 15" (shoe on)    Time Entry(in minutes):  Neuromuscular Re-Education Time Entry: 30  Therapeutic Activity Time Entry: 15    Assessment & Plan   Assessment: Savannah presented to clinic with continued soreness. Continued with " exercises which she did well with some fatigue. Will continue to progress as toelrated.   Evaluation/Treatment Tolerance: Patient tolerated treatment well    The patient will continue to benefit from skilled outpatient physical therapy in order to address the deficits listed in the problem list on the initial evaluation, provide patient and family education, and maximize the patients level of independence in the home and community environments.     The patient's spiritual, cultural, and educational needs were considered, and the patient is agreeable to the plan of care and goals.           Plan: Progress as tolerated per plan of care.    Goals:   Active       Long term goals       patient goal: Be able to walk around and perform ADLs without pain (Progressing)       Start:  06/16/25    Expected End:  08/11/25            patient will have improved FOTO score to predicted level to show true functional improvements (Progressing)       Start:  06/16/25    Expected End:  08/11/25            patient will be independent in progressed Home Exercise Program to improve functional strength (Progressing)       Start:  06/16/25    Expected End:  08/11/25               Short term goals       patient will be independent in Home Exercise Program to improve range of motion (Progressing)       Start:  06/16/25    Expected End:  07/14/25            patient will have improved range of motion to equal the uninvolved side (Progressing)       Start:  06/16/25    Expected End:  07/14/25            patient will have improved ankle strength by 1/3 MMT (Progressing)       Start:  06/16/25    Expected End:  07/14/25                Kelsy Borrego, PT, DPT  Board Certified Clinical Specialist in Orthopedic Physical Therapy

## 2025-06-26 ENCOUNTER — CLINICAL SUPPORT (OUTPATIENT)
Dept: REHABILITATION | Facility: HOSPITAL | Age: 55
End: 2025-06-26
Payer: COMMERCIAL

## 2025-06-26 DIAGNOSIS — R26.9 GAIT ABNORMALITY: ICD-10-CM

## 2025-06-26 DIAGNOSIS — M25.60 RANGE OF MOTION DEFICIT: Primary | ICD-10-CM

## 2025-06-26 PROCEDURE — 97112 NEUROMUSCULAR REEDUCATION: CPT | Mod: PN,CQ

## 2025-06-26 PROCEDURE — 97140 MANUAL THERAPY 1/> REGIONS: CPT | Mod: PN,CQ

## 2025-06-26 PROCEDURE — 97530 THERAPEUTIC ACTIVITIES: CPT | Mod: PN,CQ

## 2025-06-26 NOTE — PROGRESS NOTES
"  Outpatient Rehab    Physical Therapy Visit    Patient Name: Savannah Martines  MRN: [id number]  YOB: 1970  Encounter Date: 6/26/2025    Therapy Diagnosis:   Encounter Diagnoses   Name Primary?    Range of motion deficit Yes    Gait abnormality      Physician: Guzman Arevalo DPM    Physician Orders: Eval and Treat  Medical Diagnosis: Nondisplaced fracture of fifth metatarsal bone, right foot, subsequent encounter for fracture with routine healing  Physical deconditioning  Gait instability  Surgical Diagnosis: Not applicable for this Episode   Surgical Date: Not applicable for this Episode  Days Since Last Surgery: Not applicable for this Episode    Visit # / Visits Authorized:  3 / 15  Insurance Authorization Period: 6/16/2025 to 12/31/2025  Date of Evaluation: 6/16/2025  Plan of Care Certification: 6/16/2025 to 8/11/2025      PT/PTA: PTA   Number of PTA visits since last PT visit:1  Time In: 1500   Time Out: 1600  Total Time (in minutes): 60   Total Billable Time (in minutes): 56    FOTO:  Intake Score:  %  Survey Score 2:  %  Survey Score 3:  %    Precautions:       Subjective   No changes in symptoms..  Pain reported as 3/10.      Objective            Treatment:  Manual Therapy  MT 1: Ankle AP joint mobs grade III, subtalar joint mobs grade III  MT 2: Metatarsal ray mobilizations grade II to tolerance  Balance/Neuromuscular Re-Education  NMR 1: Ankle 4 way, red band, x 30  NMR 2: Seated heel slides x 30  NMR 3: Seated towel scrunches x 30  NMR 4: Seated toe yoga x 30 each  NMR 5: Seated arch lifts x 30  NMR 6: Seated heel raise 10# KB x 30  NMR 7: .  Therapeutic Activity  TA 1: Nustep 10' level 1 for activity tolerance  TA 2: Single leg balance on towel with B UE support 4 x 15" (shoe on)    Time Entry(in minutes):  Manual Therapy Time Entry: 8  Neuromuscular Re-Education Time Entry: 38  Therapeutic Activity Time Entry: 10    Assessment & Plan   Assessment: Savannah notes with good tolerance to " treatment. Continues to experience pain in his mid foot especially with weightbearing. Will continue to progress range of motion and strengthening as able to normalize her gait pattern.        The patient will continue to benefit from skilled outpatient physical therapy in order to address the deficits listed in the problem list on the initial evaluation, provide patient and family education, and maximize the patients level of independence in the home and community environments.     The patient's spiritual, cultural, and educational needs were considered, and the patient is agreeable to the plan of care and goals.           Plan: Progress as tolerated per plan of care.    Goals:   Active       Long term goals       patient goal: Be able to walk around and perform ADLs without pain (Progressing)       Start:  06/16/25    Expected End:  08/11/25            patient will have improved FOTO score to predicted level to show true functional improvements (Progressing)       Start:  06/16/25    Expected End:  08/11/25            patient will be independent in progressed Home Exercise Program to improve functional strength (Progressing)       Start:  06/16/25    Expected End:  08/11/25               Short term goals       patient will be independent in Home Exercise Program to improve range of motion (Progressing)       Start:  06/16/25    Expected End:  07/14/25            patient will have improved range of motion to equal the uninvolved side (Progressing)       Start:  06/16/25    Expected End:  07/14/25            patient will have improved ankle strength by 1/3 MMT (Progressing)       Start:  06/16/25    Expected End:  07/14/25                Lynette Sena PTA

## 2025-07-21 ENCOUNTER — HOSPITAL ENCOUNTER (OUTPATIENT)
Dept: RADIOLOGY | Facility: CLINIC | Age: 55
Discharge: HOME | End: 2025-07-21
Payer: COMMERCIAL

## 2025-07-21 ENCOUNTER — OFFICE VISIT (OUTPATIENT)
Dept: PODIATRY | Facility: CLINIC | Age: 55
End: 2025-07-21
Payer: COMMERCIAL

## 2025-07-21 VITALS — HEIGHT: 65 IN | HEART RATE: 72 BPM | WEIGHT: 129.63 LBS | OXYGEN SATURATION: 98 % | BODY MASS INDEX: 21.6 KG/M2

## 2025-07-21 DIAGNOSIS — S93.491D SPRAIN OF ANTERIOR TALOFIBULAR LIGAMENT OF RIGHT ANKLE, SUBSEQUENT ENCOUNTER: ICD-10-CM

## 2025-07-21 DIAGNOSIS — R26.81 GAIT INSTABILITY: ICD-10-CM

## 2025-07-21 DIAGNOSIS — R53.81 PHYSICAL DECONDITIONING: ICD-10-CM

## 2025-07-21 DIAGNOSIS — M79.671 RIGHT FOOT PAIN: ICD-10-CM

## 2025-07-21 DIAGNOSIS — S93.411D SPRAIN OF CALCANEOFIBULAR LIGAMENT OF RIGHT ANKLE, SUBSEQUENT ENCOUNTER: ICD-10-CM

## 2025-07-21 DIAGNOSIS — M25.571 ACUTE RIGHT ANKLE PAIN: ICD-10-CM

## 2025-07-21 DIAGNOSIS — S92.354D NONDISPLACED FRACTURE OF FIFTH METATARSAL BONE, RIGHT FOOT, SUBSEQUENT ENCOUNTER FOR FRACTURE WITH ROUTINE HEALING: Primary | ICD-10-CM

## 2025-07-21 PROCEDURE — 99999 PR PBB SHADOW E&M-EST. PATIENT-LVL III: CPT | Mod: PBBFAC,,,

## 2025-07-21 PROCEDURE — 3008F BODY MASS INDEX DOCD: CPT | Mod: CPTII,S$GLB,,

## 2025-07-21 PROCEDURE — 73630 X-RAY EXAM OF FOOT: CPT | Mod: RT,S$GLB,, | Performed by: RADIOLOGY

## 2025-07-21 PROCEDURE — 1159F MED LIST DOCD IN RCRD: CPT | Mod: CPTII,S$GLB,,

## 2025-07-21 PROCEDURE — 99214 OFFICE O/P EST MOD 30 MIN: CPT | Mod: S$GLB,,,

## 2025-07-21 PROCEDURE — 1160F RVW MEDS BY RX/DR IN RCRD: CPT | Mod: CPTII,S$GLB,,
